# Patient Record
Sex: MALE | Race: WHITE
[De-identification: names, ages, dates, MRNs, and addresses within clinical notes are randomized per-mention and may not be internally consistent; named-entity substitution may affect disease eponyms.]

---

## 2018-04-20 ENCOUNTER — HOSPITAL ENCOUNTER (EMERGENCY)
Dept: HOSPITAL 80 - FED | Age: 78
Discharge: HOME | End: 2018-04-20
Payer: COMMERCIAL

## 2018-04-20 VITALS — DIASTOLIC BLOOD PRESSURE: 70 MMHG | SYSTOLIC BLOOD PRESSURE: 134 MMHG

## 2018-04-20 DIAGNOSIS — R21: Primary | ICD-10-CM

## 2018-04-20 LAB — PLATELET # BLD: 169 10^3/UL (ref 150–400)

## 2018-04-20 PROCEDURE — 99284 EMERGENCY DEPT VISIT MOD MDM: CPT

## 2018-04-20 PROCEDURE — 96375 TX/PRO/DX INJ NEW DRUG ADDON: CPT

## 2018-04-20 PROCEDURE — 96374 THER/PROPH/DIAG INJ IV PUSH: CPT

## 2018-04-20 NOTE — EDPHY
H & P


Time Seen by Provider: 04/20/18 01:44


HPI/ROS: 





HPI





CHIEF COMPLAINT:  Itchy Rash





HISTORY OF PRESENT ILLNESS:  Patient is a 77-year-old male, he presents 

emergency room with a rash x7 days.  Reports that it is very itchy.  It has 

progressively gotten worse over the past 7 days.  It is mainly truncal across 

his chest and abdomen and back.  However notice some on his legs in his inner 

thighs.  He reports that it or rubs mainly with pressure points so when he 

sitting in a ruptured in the back of his legs and but.  When he tends to 

scratch his skin sometimes a ruptured after scratching or gets worse.  It is 

very pruritic.  He denies any fever.  He states he went to his chiropractor 

about 10 days ago and had a panel blood work done for routine blood work and 

noticed that his testosterone level was low he started taking supplemental 

testosterone this is the only new medication he has been taking.  He denies 

fever.  Denies blisters.  Denies pain.  But has pruritus.


He decided come the emergency room at 2 o'clock in the morning after 7 days of 

this as the rash is progressively getting worse and more itchy.  He cannot stop 

itching and is causing him to not sleep.  He does know any contacts are sick 

contacts


He has not seen his primary care doctor for this.





Past Medical History:  Enlarged prostate, prostatitis





Past Surgical History:  Shoulder surgery, bony spur surgery





Social History:  Denies drugs alcohol tobacco.





Family History:  Noncontributory








ROS   


REVIEW OF SYSTEMS:


A comprehensive 10 point review of systems is otherwise negative aside from 

elements mentioned in the history of present illness.








Exam   


Constitutional appears well nontoxic no acute distress  triage nursing summary 

reviewed, vital signs reviewed, awake/alert. 


Eyes   normal conjunctivae and sclera, EOMI, PERRLA. 


HENT no mucosal lesions  normal inspection, atraumatic, moist mucus membranes, 

no epistaxis, neck supple/ no meningismus, no raccoon eyes. 


Respiratory   clear to auscultation bilaterally, normal breath sounds, no 

respiratory distress, no wheezing. 


Cardiovascular   rate normal, regular rhythm, no murmur, no edema, distal 

pulses normal. 


Gastrointestinal   soft, non-tender, no rebound, no guarding, normal bowel 

sounds, no distension, no pulsatile mass. 


Genitourinary   no CVA tenderness. 


Musculoskeletal  no midline vertebral tenderness, full range of motion, no calf 

swelling, no tenderness of extremities, no meningismus, good pulses, 

neurovascularly intact.


Skin  there is a diffuse sandpaper type rash.  Mainly on his chest and abdomen.

  These do mile.  They are rough like sandpaper.  It spares palms and soles.  

There is no mucosal lesions.  They are very itchy.  There is no weeping or pus.

  Or drainage.  No bullae.  No petechiae.


Neurologic   awake, alert and oriented x 3, AAOx3, moves all 4 extremities 

equally, motor intact, sensory intact, CN II-XII intact, normal cerebellar, 

normal vision, normal speech. 


Psychiatric   normal mood/affect. 


Heme/Lymph/Immune   no lymphadenopathy.





Differential Diagnosis:  Includes but is not limited to in a particular order 

viral exanthem, dermatitis, vasculitis, scabies, allergic reaction, dermatitis





Medical Decision Making:  Plan for this patient will check basic blood work, IV 

established with IV Decadron dose given, as well as IV Benadryl.


Recommend close follow-up with his primary care doctor.





Additionally discussed return precautions with me understands return if he 

develops mucosal lesions, fever, they appear infected, order getting worse.





Re-evaluation:








0305:  Re-examination at this time patient received Decadron here this feeling 

much better.  Rash is improved after steroids.  Itching is improved.  He is 

nontoxic-appearing blood work is reassuring with no high white count.  He is 

afebrile.  He feels better would like to go home.


Recommend close follow-up with his primary care doctor.  Additionally 

prednisone for 5 days and Benadryl.  Return precautions discussed with him he 

understands.  This is a nontoxic-appearing rash.  It is pruritic.  His most 

likely an exanthem versus dermatitis.  Return precautions discussed.


Source: Patient





- Medical/Surgical History


Hx Asthma: No


Hx Chronic Respiratory Disease: No


Hx Diabetes: No


Hx Cardiac Disease: No


Hx Renal Disease: No


Hx Cirrhosis: No


Hx Alcoholism: No


Hx HIV/AIDS: No


Hx Splenectomy or Spleen Trauma: No


Other PMH: PE 6 mos ago





- Social History


Smoking Status: Never smoked


Constitutional: 


 Initial Vital Signs











Temperature (C)  36.5 C   04/20/18 01:49


 


Heart Rate  81   04/20/18 01:49


 


Respiratory Rate  16   04/20/18 01:49


 


Blood Pressure  169/91 H  04/20/18 01:49


 


O2 Sat (%)  95   04/20/18 01:49








 











O2 Delivery Mode               Room Air














Allergies/Adverse Reactions: 


 





Sulfa (Sulfonamide Antibiotics) Allergy (Severe, Verified 03/20/14 10:39)


 TURNED BLUE


Penicillins Allergy (Mild, Verified 03/20/14 10:39)


 Rash


GREEN PEAS Allergy (Severe, Uncoded 03/20/14 10:39)


 LIPS SWELLING


PEANUTS Allergy (Severe, Uncoded 03/20/14 10:39)


 Anaphylaxis/HIVES ON INSIDE OF THROAT


COD FISH Allergy (Intermediate, Uncoded 03/20/14 10:39)


 Vomiting


EGGS Allergy (Intermediate, Uncoded 03/20/14 10:39)


 VOMITING/HIVES








Home Medications: 














 Medication  Instructions  Recorded


 


Natoconase  04/20/18


 


diphenhydrAMINE [Benadryl 25 MG 25 mg PO BID #14 tab 04/20/18





(*)]  


 


predniSONE 60 mg PO DAILY #15 tab 04/20/18














Medical Decision Making





- Data Points


Laboratory Results: 


 Laboratory Results





 04/20/18 02:05 





 04/20/18 02:05 





 











  04/20/18 04/20/18





  02:05 02:05


 


WBC    8.01 10^3/uL 10^3/uL





    (3.80-9.50) 


 


RBC    6.01 10^6/uL 10^6/uL





    (4.40-6.38) 


 


Hgb    17.9 g/dL H g/dL





    (13.7-17.5) 


 


Hct    53.5 % H %





    (40.0-51.0) 


 


MCV    89.0 fL fL





    (81.5-99.8) 


 


MCH    29.8 pg pg





    (27.9-34.1) 


 


MCHC    33.5 g/dL g/dL





    (32.4-36.7) 


 


RDW    13.9 % %





    (11.5-15.2) 


 


Plt Count    169 10^3/uL 10^3/uL





    (150-400) 


 


MPV    10.0 fL fL





    (8.7-11.7) 


 


Neut % (Auto)    70.9 % %





    (39.3-74.2) 


 


Lymph % (Auto)    18.6 % %





    (15.0-45.0) 


 


Mono % (Auto)    8.9 % %





    (4.5-13.0) 


 


Eos % (Auto)    1.2 % %





    (0.6-7.6) 


 


Baso % (Auto)    0.2 % L %





    (0.3-1.7) 


 


Nucleat RBC Rel Count    0.0 % %





    (0.0-0.2) 


 


Absolute Neuts (auto)    5.67 10^3/uL 10^3/uL





    (1.70-6.50) 


 


Absolute Lymphs (auto)    1.49 10^3/uL 10^3/uL





    (1.00-3.00) 


 


Absolute Monos (auto)    0.71 10^3/uL 10^3/uL





    (0.30-0.80) 


 


Absolute Eos (auto)    0.10 10^3/uL 10^3/uL





    (0.03-0.40) 


 


Absolute Basos (auto)    0.02 10^3/uL 10^3/uL





    (0.02-0.10) 


 


Absolute Nucleated RBC    0.00 10^3/uL 10^3/uL





    (0-0.01) 


 


Immature Gran %    0.2 % %





    (0.0-1.1) 


 


Immature Gran #    0.02 10^3/uL 10^3/uL





    (0.00-0.10) 


 


ESR    4 MM/HR MM/HR





    (0-20) 


 


Sodium  144 mEq/L mEq/L  





   (135-145)  


 


Potassium  4.4 mEq/L mEq/L  





   (3.5-5.2)  


 


Chloride  105 mEq/L mEq/L  





   ()  


 


Carbon Dioxide  27 mEq/l mEq/l  





   (22-31)  


 


Anion Gap  12 mEq/L mEq/L  





   (8-16)  


 


BUN  24 mg/dL H mg/dL  





   (7-23)  


 


Creatinine  1.2 mg/dL mg/dL  





   (0.7-1.3)  


 


Estimated GFR  59   





   


 


Glucose  102 mg/dL H mg/dL  





   ()  


 


Calcium  8.9 mg/dL mg/dL  





   (8.5-10.4)  


 


Magnesium  2.1 mg/dL mg/dL  





   (1.6-2.3)  


 


Total Bilirubin  0.8 mg/dL mg/dL  





   (0.1-1.4)  


 


Conjugated Bilirubin  0.5 mg/dL mg/dL  





   (0.0-0.5)  


 


Unconjugated Bilirubin  0.3 mg/dL mg/dL  





   (0.0-1.1)  


 


AST  22 IU/L IU/L  





   (17-59)  


 


ALT  45 IU/L IU/L  





   (21-72)  


 


Alkaline Phosphatase  57 IU/L IU/L  





   ()  


 


C-Reactive Protein  5.3 mg/L mg/L  





   (<10.0)  


 


Total Protein  7.2 g/dL g/dL  





   (6.3-8.2)  


 


Albumin  4.2 g/dL g/dL  





   (3.5-5.0)  











Medications Given: 


 








Discontinued Medications





Dexamethasone (Decadron Injection)  10 mg IVP EDNOW ONE


   Stop: 04/20/18 02:05


   Last Admin: 04/20/18 02:11 Dose:  10 mg


Diphenhydramine HCl (Benadryl Injection)  25 mg IVP EDNOW ONE


   Stop: 04/20/18 02:05


   Last Admin: 04/20/18 02:10 Dose:  25 mg








Departure





- Departure


Disposition: Home, Routine, Self-Care


Clinical Impression: 


 Rash





Condition: Good


Instructions:  Acute Rash (ED)


Additional Instructions: 


1. Follow up with her primary care doctor.


2. Prednisone and Benadryl prescribed.


3. Return emergency room if he develops fever or the rash is getting worse.


4. Stop taking the testosterone supplement as you started this and then broke 

out into a rash that maybe this medication.


5. Follow up with her primary care doctor about your low testosterone.


Referrals: 


Christine Jasmine MD [Primary Care Provider] - As per Instructions


Prescriptions: 


diphenhydrAMINE [Benadryl 25 MG (*)] 25 mg PO BID #14 tab


predniSONE 60 mg PO DAILY #15 tab

## 2019-02-11 ENCOUNTER — HOSPITAL ENCOUNTER (OUTPATIENT)
Dept: HOSPITAL 80 - BMCIMAGING | Age: 79
End: 2019-02-11
Attending: FAMILY MEDICINE
Payer: COMMERCIAL

## 2019-02-11 DIAGNOSIS — I82.4Z1: Primary | ICD-10-CM
